# Patient Record
Sex: MALE | Race: WHITE | NOT HISPANIC OR LATINO | URBAN - METROPOLITAN AREA
[De-identification: names, ages, dates, MRNs, and addresses within clinical notes are randomized per-mention and may not be internally consistent; named-entity substitution may affect disease eponyms.]

---

## 2022-11-29 ENCOUNTER — OFFICE (OUTPATIENT)
Dept: URBAN - METROPOLITAN AREA CLINIC 76 | Facility: CLINIC | Age: 57
End: 2022-11-29
Payer: MEDICAID

## 2022-11-29 VITALS
SYSTOLIC BLOOD PRESSURE: 120 MMHG | WEIGHT: 160 LBS | HEART RATE: 94 BPM | OXYGEN SATURATION: 97 % | DIASTOLIC BLOOD PRESSURE: 82 MMHG

## 2022-11-29 DIAGNOSIS — R94.5 ABNORMAL RESULTS OF LIVER FUNCTION STUDIES: ICD-10-CM

## 2022-11-29 DIAGNOSIS — B18.2 CHRONIC VIRAL HEPATITIS C: ICD-10-CM

## 2022-11-29 PROCEDURE — 99203 OFFICE O/P NEW LOW 30 MIN: CPT | Performed by: INTERNAL MEDICINE

## 2022-11-29 NOTE — SERVICENOTES
records reviewed.  Hemoglobin 10.4.  In October AST 69 ALT 96.  A repeat lab work done this month AST 97 .  Bilirubin and alkaline phosphatase normal.

## 2022-11-29 NOTE — SERVICEHPINOTES
Mister Painting is a 57-year-old gentleman with multiple medical problems who is here today due to transaminitis, he tells me he has a known diagnosis of hepatitis C.  He says that someone treated him years ago but he stopped the treatment, he never finished therapy.  He doesn't recall who treated him with what medication.  He does have a history of IV drug use.  He is not a drinker.  There is no family history of liver disease.  He just had lower extremity amputations done an he is recovering from surgery.  He says he just had his last surgery a couple of days ago. his alkaline phosphatase and bilirubin are normal.  His AST has ranged from 69-97, ALT has ranged from .. 
br
br He has no upper or lower GI complaints.  There is no reflux, dysphagia, odynophagia nausea or vomiting.  There is no melena or hematemesis.  There is no lower abdominal pain, diarrhea constipation or blood in the stool.  There is no known family history of polyps colitis or colon cancer.  He looks chronically ill but is in no acute distress.  He is currently in a nursing home, he says he smokes about 12 cigarettes a day..